# Patient Record
Sex: FEMALE | Race: WHITE | Employment: UNEMPLOYED | URBAN - METROPOLITAN AREA
[De-identification: names, ages, dates, MRNs, and addresses within clinical notes are randomized per-mention and may not be internally consistent; named-entity substitution may affect disease eponyms.]

---

## 2022-01-27 ENCOUNTER — HOSPITAL ENCOUNTER (EMERGENCY)
Age: 42
Discharge: HOME | End: 2022-01-27
Payer: COMMERCIAL

## 2022-01-27 VITALS
DIASTOLIC BLOOD PRESSURE: 73 MMHG | HEIGHT: 67 IN | TEMPERATURE: 96.9 F | WEIGHT: 140 LBS | OXYGEN SATURATION: 100 % | BODY MASS INDEX: 21.97 KG/M2 | HEART RATE: 98 BPM | SYSTOLIC BLOOD PRESSURE: 120 MMHG | RESPIRATION RATE: 16 BRPM

## 2022-01-27 DIAGNOSIS — L02.91 ABSCESS: Primary | ICD-10-CM

## 2022-01-27 PROCEDURE — 2500000003 HC RX 250 WO HCPCS: Performed by: NURSE PRACTITIONER

## 2022-01-27 PROCEDURE — 99284 EMERGENCY DEPT VISIT MOD MDM: CPT

## 2022-01-27 PROCEDURE — 6360000002 HC RX W HCPCS: Performed by: NURSE PRACTITIONER

## 2022-01-27 PROCEDURE — 87070 CULTURE OTHR SPECIMN AEROBIC: CPT

## 2022-01-27 PROCEDURE — 87077 CULTURE AEROBIC IDENTIFY: CPT

## 2022-01-27 PROCEDURE — 96372 THER/PROPH/DIAG INJ SC/IM: CPT

## 2022-01-27 PROCEDURE — 87186 SC STD MICRODIL/AGAR DIL: CPT

## 2022-01-27 PROCEDURE — 6370000000 HC RX 637 (ALT 250 FOR IP): Performed by: NURSE PRACTITIONER

## 2022-01-27 PROCEDURE — 87205 SMEAR GRAM STAIN: CPT

## 2022-01-27 PROCEDURE — 10060 I&D ABSCESS SIMPLE/SINGLE: CPT

## 2022-01-27 RX ORDER — LORAZEPAM 1 MG/1
1 TABLET ORAL ONCE
Status: COMPLETED | OUTPATIENT
Start: 2022-01-27 | End: 2022-01-27

## 2022-01-27 RX ORDER — KETOROLAC TROMETHAMINE 30 MG/ML
30 INJECTION, SOLUTION INTRAMUSCULAR; INTRAVENOUS ONCE
Status: COMPLETED | OUTPATIENT
Start: 2022-01-27 | End: 2022-01-27

## 2022-01-27 RX ORDER — NAPROXEN 500 MG/1
500 TABLET ORAL 2 TIMES DAILY WITH MEALS
Qty: 14 TABLET | Refills: 0 | Status: SHIPPED | OUTPATIENT
Start: 2022-01-27 | End: 2022-04-08

## 2022-01-27 RX ORDER — SULFAMETHOXAZOLE AND TRIMETHOPRIM 800; 160 MG/1; MG/1
1 TABLET ORAL ONCE
Status: COMPLETED | OUTPATIENT
Start: 2022-01-27 | End: 2022-01-27

## 2022-01-27 RX ORDER — CEFUROXIME AXETIL 500 MG/1
500 TABLET ORAL 2 TIMES DAILY
Qty: 20 TABLET | Refills: 0 | Status: SHIPPED | OUTPATIENT
Start: 2022-01-27 | End: 2022-02-06

## 2022-01-27 RX ORDER — SULFAMETHOXAZOLE AND TRIMETHOPRIM 800; 160 MG/1; MG/1
1 TABLET ORAL 2 TIMES DAILY
Qty: 20 TABLET | Refills: 0 | Status: SHIPPED | OUTPATIENT
Start: 2022-01-27 | End: 2022-02-06

## 2022-01-27 RX ORDER — LIDOCAINE HYDROCHLORIDE 10 MG/ML
5 INJECTION, SOLUTION INFILTRATION; PERINEURAL ONCE
Status: COMPLETED | OUTPATIENT
Start: 2022-01-27 | End: 2022-01-27

## 2022-01-27 RX ADMIN — LORAZEPAM 1 MG: 1 TABLET ORAL at 12:49

## 2022-01-27 RX ADMIN — SULFAMETHOXAZOLE AND TRIMETHOPRIM 1 TABLET: 800; 160 TABLET ORAL at 14:28

## 2022-01-27 RX ADMIN — KETOROLAC TROMETHAMINE 30 MG: 30 INJECTION, SOLUTION INTRAMUSCULAR at 12:49

## 2022-01-27 RX ADMIN — LIDOCAINE HYDROCHLORIDE 5 ML: 10 INJECTION, SOLUTION INFILTRATION; PERINEURAL at 12:52

## 2022-01-27 ASSESSMENT — PAIN DESCRIPTION - LOCATION: LOCATION: OTHER (COMMENT)

## 2022-01-27 ASSESSMENT — PAIN DESCRIPTION - PROGRESSION: CLINICAL_PROGRESSION: NOT CHANGED

## 2022-01-27 ASSESSMENT — PAIN DESCRIPTION - FREQUENCY: FREQUENCY: CONTINUOUS

## 2022-01-27 ASSESSMENT — PAIN DESCRIPTION - ORIENTATION: ORIENTATION: RIGHT

## 2022-01-27 ASSESSMENT — PAIN SCALES - GENERAL
PAINLEVEL_OUTOF10: 10
PAINLEVEL_OUTOF10: 8
PAINLEVEL_OUTOF10: 10

## 2022-01-27 ASSESSMENT — PAIN DESCRIPTION - ONSET: ONSET: ON-GOING

## 2022-01-27 ASSESSMENT — PAIN DESCRIPTION - PAIN TYPE: TYPE: ACUTE PAIN

## 2022-01-27 NOTE — ED PROVIDER NOTES
[address]  Department of Emergency Medicine   ED  Encounter Note  Admit Date/RoomTime: 2022 12:14 PM  ED Room:   NAME: Kai Barbour  : 1980  MRN: [id number]     Chief Complaint:  Wound Check (lump under right axilla, patient squeezed it and poked a needle through it last night, worse today, large red area no drainage)    HISTORY OF PRESENT ILLNESS        Kai Barbour is a 35 y.o. female who presents to the ED for evaluation of lumps in her right armpit that began 3 to 5 days ago. Patient states that her pain is aggravated by palpation and movement. She attempted to relieve the irritation by popping it with a needle last night. She states nothing came out of it and it made it more sore. She is noticed redness, swelling and discomfort with no associated fever, chills, myalgias or malaise. She denies a history of this in the past and reports her tetanus vaccination to be up-to-date. She denies being a diabetic. She denies being on methadone and states she has been clean from heroin for over 5 years. Her symptoms are moderate in severity and persistent nature. ROS   Pertinent positives and negatives are stated within HPI, all other systems reviewed and are negative. Past Medical History:  has a past medical history of Depression, Hepatitis C, History of MRSA infection, Substance abuse ([address] and Varicosities. Surgical History:  has a past surgical history that includes  section and Dilation & curettage. Social History:  reports that she has been smoking. She has a 8.50 pack-year smoking history. She has never used smokeless tobacco. She reports that she does not drink alcohol and does not use drugs. Family History: family history is not on file. Allergies: Patient has no known allergies. PHYSICAL EXAM   Oxygen Saturation Interpretation: Normal on room air analysis.         ED Triage Vitals   BP Temp Temp Source Pulse Resp SpO2 Height Weight   01/27/22 1210 01/27/22 1210 01/27/22 1210 01/27/22 1210 01/27/22 1210 01/27/22 1210 01/27/22 1217 01/27/22 1217   120/73 96.9 °F (36.1 °C) Infrared 98 16 100 % 5' 7\" (1.702 m) 140 lb (63.5 kg)       Physical Exam  Constitutional/General: Alert and oriented x3, well appearing, non toxic  HEENT:  NC/NT. PERRLA. Oral mucosa moist. Airway patent. Neck: Supple, full ROM. No midline vertebral tenderness or crepitus. Respiratory: Lung sounds clear to auscultation bilaterally. No wheezes, rhonchi or stridor. Not in respiratory distress. CV:  Regular rate. No resting tachycardia, HR 86 bpm. Regular rhythm. No murmurs or rubs. 2+ distal pulses. Musculoskeletal: Moves all extremities x 4. No bony tenderness to the right upper extremity. Strong radial and ulnar pulse. Full sensory and motor movement along the radial, median and ulnar nerve distribution of the right upper extremity. Warm and well perfused. Capillary refill <3 seconds  Integument: Skin warm and dry. No rashes. To the right axilla are two abscesses with localized erythema and central fluctuance with no spontaneous drainage. They are tender to manipulation and without lymphangitis. Neurologic: Alert and oriented with no focal deficits, symmetric strength 5/5 in the upper and lower extremities bilaterally. Psychiatric: Anxious affect. Good eye contact. No hallucinations or delusions. No suicidal ideation. Patient does not appear intoxicated. Lab / Imaging Results   (All laboratory and radiology results have been personally reviewed by myself)  Labs:  No results found for this visit on 01/27/22. Imaging: All Radiology results interpreted by Radiologist unless otherwise noted.   No orders to display       ED Course / Medical Decision Making     Medications   ketorolac (TORADOL) injection 30 mg (30 mg IntraMUSCular Given 1/27/22 1249)   lidocaine 1 % injection 5 mL (5 mLs IntraDERmal Given by Other 1/27/22 1252)   LORazepam (ATIVAN) tablet 1 mg (1 mg Oral Given 1/27/22 1249)   sulfamethoxazole-trimethoprim (BACTRIM DS;SEPTRA DS) 800-160 MG per tablet 1 tablet (1 tablet Oral Given 1/27/22 1428)        Re-examination:  1/27/22       Time: 1430  Patients condition is improving after treatment. Consult(s):   None    Procedure(s):     PROCEDURE  1/27/22       Time: 1410    INCISION AND DRAINAGE  Risks, benefits and alternatives (for applicable procedures below) described. Performed By: ELIAS Lomas CNP. Indication: axillary abscess  Informed consent obtained: The patient provided verbal consent for this procedure. .  Prep: The skin was cleansed with povidone iodine and draped in a sterile fashion. Local Anesthesia:  obtained with Lidocaine 1% without epinephrine. Incision: The axillary abscess was Incised by scalpal and copious, purulent fluid was drained. A wound culture was obtained. The wound  was irrigated and was not packed with iodoform gauze. The wound was then covered with a sterile dressing. Patient tolerated the procedure well. MDM:   Patient is afebrile and not tachycardic. She has localized erythema without cellulitis or lymphangitis. Given her history of MRSA infection, she was covered with Bactrim with the addition of Ceftin given the localized erythema and naproxen twice daily with food. Given her anxiety, her abscesses were not packed. Her tetanus vaccination is reported to be up-to-date. She was given outpatient follow-up with [place] [address] physicians in general surgery clinic. She is aware of signs and symptoms indicative of reevaluation in the emergency department setting. Otherwise warm compresses, antibiotics and NSAIDs while pending with follow-up appointment. Patient verbalizes understanding of instructions and departed in stable condition.      Plan of Care/Counseling:  ELIAS Lomas CNP reviewed today's visit with the patient and significant other in addition to providing specific details for the plan of care and counseling regarding the diagnosis and prognosis. Questions are answered at this time and are agreeable with the plan. ASSESSMENT     1. Abscess      PLAN   Discharged home. Patient condition is stable    New Medications     Discharge Medication List as of 1/27/2022  2:36 PM      START taking these medications    Details   sulfamethoxazole-trimethoprim (BACTRIM DS;SEPTRA DS) 800-160 MG per tablet Take 1 tablet by mouth 2 times daily for 10 days, Disp-20 tablet, R-0Normal      cefUROXime (CEFTIN) 500 MG tablet Take 1 tablet by mouth 2 times daily for 10 days, Disp-20 tablet, R-0Normal      naproxen (NAPROSYN) 500 MG tablet Take 1 tablet by mouth 2 times daily (with meals) for 7 days, Disp-14 tablet, R-0Normal           Electronically signed by ELIAS Don CNP   DD: 1/27/22  **This report was transcribed using voice recognition software. Every effort was made to ensure accuracy; however, inadvertent computerized transcription errors may be present.   END OF ED PROVIDER NOTE       ELIAS Don CNP  01/27/22 3242

## 2022-01-30 LAB
GRAM STAIN RESULT: ABNORMAL
ORGANISM: ABNORMAL
WOUND/ABSCESS: ABNORMAL

## 2022-04-08 ENCOUNTER — HOSPITAL ENCOUNTER (OUTPATIENT)
Dept: PSYCHIATRY | Age: 42
Setting detail: THERAPIES SERIES
Discharge: HOME | End: 2022-04-08
Payer: COMMERCIAL

## 2022-04-08 PROCEDURE — 90791 PSYCH DIAGNOSTIC EVALUATION: CPT

## 2022-04-08 ASSESSMENT — SLEEP AND FATIGUE QUESTIONNAIRES
DIFFICULTY FALLING ASLEEP: YES
DIFFICULTY ARISING: NO
DO YOU USE A SLEEP AID: NO
DIFFICULTY STAYING ASLEEP: YES
AVERAGE NUMBER OF SLEEP HOURS: 5

## 2022-04-08 ASSESSMENT — ANXIETY QUESTIONNAIRES
2. NOT BEING ABLE TO STOP OR CONTROL WORRYING: 3
5. BEING SO RESTLESS THAT IT IS HARD TO SIT STILL: 3
3. WORRYING TOO MUCH ABOUT DIFFERENT THINGS: 3
6. BECOMING EASILY ANNOYED OR IRRITABLE: 3
7. FEELING AFRAID AS IF SOMETHING AWFUL MIGHT HAPPEN: 3
GAD7 TOTAL SCORE: 21
IF YOU CHECKED OFF ANY PROBLEMS ON THIS QUESTIONNAIRE, HOW DIFFICULT HAVE THESE PROBLEMS MADE IT FOR YOU TO DO YOUR WORK, TAKE CARE OF THINGS AT HOME, OR GET ALONG WITH OTHER PEOPLE: EXTREMELY DIFFICULT
1. FEELING NERVOUS, ANXIOUS, OR ON EDGE: 3
4. TROUBLE RELAXING: 3

## 2022-04-08 ASSESSMENT — LIFESTYLE VARIABLES: HISTORY_ALCOHOL_USE: NO

## 2022-04-08 ASSESSMENT — PATIENT HEALTH QUESTIONNAIRE - PHQ9: SUM OF ALL RESPONSES TO PHQ QUESTIONS 1-9: 19
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
## 2022-04-26 ENCOUNTER — HOSPITAL ENCOUNTER (OUTPATIENT)
Dept: PSYCHIATRY | Age: 42
Setting detail: THERAPIES SERIES
Discharge: HOME | End: 2022-04-26
Payer: COMMERCIAL

## 2022-04-26 DIAGNOSIS — F41.9 ANXIETY DISORDER, UNSPECIFIED TYPE: ICD-10-CM

## 2022-04-26 DIAGNOSIS — F90.9 ATTENTION DEFICIT HYPERACTIVITY DISORDER (ADHD), UNSPECIFIED ADHD TYPE: Primary | ICD-10-CM

## 2022-04-26 PROCEDURE — H2020 THER BEHAV SVC, PER DIEM: HCPCS

## 2022-04-26 PROCEDURE — 90792 PSYCH DIAG EVAL W/MED SRVCS: CPT | Performed by: PSYCHIATRY & NEUROLOGY

## 2022-04-26 RX ORDER — DEXTROAMPHETAMINE SACCHARATE, AMPHETAMINE ASPARTATE, DEXTROAMPHETAMINE SULFATE AND AMPHETAMINE SULFATE 2.5; 2.5; 2.5; 2.5 MG/1; MG/1; MG/1; MG/1
10 TABLET ORAL 2 TIMES DAILY
Qty: 28 TABLET | Refills: 0 | Status: SHIPPED | OUTPATIENT
Start: 2022-04-26 | End: 2022-05-03 | Stop reason: HOSPADM

## 2022-04-26 RX ORDER — DIVALPROEX SODIUM 250 MG/1
250 TABLET, DELAYED RELEASE ORAL 2 TIMES DAILY
Qty: 28 TABLET | Refills: 0 | Status: SHIPPED | OUTPATIENT
Start: 2022-04-26 | End: 2022-05-03 | Stop reason: SDUPTHER

## 2022-04-26 RX ORDER — CLONAZEPAM 0.5 MG/1
0.5 TABLET ORAL 2 TIMES DAILY
Qty: 28 TABLET | Refills: 0 | Status: SHIPPED | OUTPATIENT
Start: 2022-04-26 | End: 2022-05-03 | Stop reason: SDUPTHER

## 2022-04-26 NOTE — PROGRESS NOTES
Group Therapy Note     Date: 4/26/2022  Start Time: 840  End Time:  1030  Number of Participants: 8     Type of Group: Psychotherapy     Patient's Goal: To increase socialization and improve interpersonal relationships.     Notes: This was pts first iop group. She was initially reluctant to participate and say anything but by end of group had started to share some basic personal information regarding feelings d/t conflicts in relationships, racing thoughts, and that she had been to half-way in past and how that had changed her for the good. Group members were supportive of pt. Pt has begun to engage with others, related to peers, shared personal issues and provided some support and encouragement to others. Pt reports some improvement in mood following group.      Status After Intervention:  Unchanged     Participation Level:  Active Listener and Interactive     Participation Quality: Appropriate, Attentive, Sharing, and Supportive        Speech:  normal        Thought Process/Content: Logical        Affective Functioning: Blunted        Mood: depressed and anxious        Level of consciousness:  Alert, Oriented x4, and Attentive        Response to Learning: Able to verbalize current knowledge/experience, Able to verbalize/acknowledge new learning, and Progressing to goal        Endings: None Reported     Modes of Intervention: Support, Socialization, and Exploration        Discipline Responsible: /Counselor        Signature: PANTERA Fajardo

## 2022-04-26 NOTE — PLAN OF CARE
Group Therapy Note     Date: 4/26/2022  Start Time: 7576  End Time:  1200  Number of Participants: 9     Type of Group: Recovery     Group Topic: Anger/Anxiety  Module Name: ex. 12: What is driving our anger (or anxiety)/ Iceberg activity     Patient's Goal: Pt will participate in discussion related to emotions that might be behind our anger or anxiety. Pt will be able to identify emotions that are seen as more acceptable than other emotions and identify triggers that lead to anger and anxiety. Pt will set a committed action goal.     Notes: Pt participated actively in group discussion and reflected on concepts discussed. Pt engaged with others, shared personal experiences, and was open to learning new information. Values:personal health  Committed Action Goal: focus on me tonight     Status After Intervention: Unchanged     Participation Level:  Active Listener and Interactive     Participation Quality: Appropriate, Attentive, Sharing, and Supportive        Speech:  normal        Thought Process/Content: Logical        Affective Functioning: Blunted        Mood: depressed and anxious        Level of consciousness:  Alert, Oriented x4, and Attentive        Response to Learning: Able to verbalize current knowledge/experience, Able to verbalize/acknowledge new learning, and Progressing to goal        Endings: None Reported     Modes of Intervention: Education, Support, Socialization, Exploration, and Activity        Discipline Responsible: /Counselor        Signature:  PANTERA Gamboa
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
## 2022-04-28 ENCOUNTER — HOSPITAL ENCOUNTER (OUTPATIENT)
Dept: PSYCHIATRY | Age: 42
Setting detail: THERAPIES SERIES
Discharge: HOME | End: 2022-04-28
Payer: COMMERCIAL
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
## 2022-05-02 ENCOUNTER — HOSPITAL ENCOUNTER (OUTPATIENT)
Dept: PSYCHIATRY | Age: 42
Setting detail: THERAPIES SERIES
Discharge: HOME | End: 2022-05-02
Payer: COMMERCIAL

## 2022-05-02 PROCEDURE — H2020 THER BEHAV SVC, PER DIEM: HCPCS
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
## 2022-05-02 NOTE — PROGRESS NOTES
Group Therapy Note     Date: 5/02/2022  Start Time: 4941  End Time:  1200  Number of Participants: 8     Type of Group: Recovery     Group Topic: Depression  Module Name: ex. 3: What is Depression like for me. ACT activity: Mindfulness/relaxation training   ACT Principal: demonstrating willingness and values     Patient's Goal: Pt will participate in discussion of how depression affects them mentally, emotionally, physically, and spirituality. Pt will set a committed action goal.     Notes: Pt participated actively in group discussion and reflected on concepts discussed. Pt engaged with others, shared personal experiences, and was open to learning new information. Status After Intervention: Unchanged     Participation Level:  Active Listener and Interactive     Participation Quality: Appropriate, Attentive, Sharing, and Supportive        Speech:  normal        Thought Process/Content: Logical        Affective Functioning: Blunted        Mood: depressed and anxious        Level of consciousness:  Alert, Oriented x4, and Attentive        Response to Learning: Able to verbalize current knowledge/experience, Able to verbalize/acknowledge new learning, and Progressing to goal        Endings: None Reported     Modes of Intervention: Education, Support, Socialization, Exploration, and Activity        Discipline Responsible: /Counselor        Signature:  PANTERA Russo

## 2022-05-03 ENCOUNTER — HOSPITAL ENCOUNTER (OUTPATIENT)
Dept: PSYCHIATRY | Age: 42
Setting detail: THERAPIES SERIES
Discharge: HOME | End: 2022-05-03
Payer: COMMERCIAL

## 2022-05-03 DIAGNOSIS — F90.9 ATTENTION DEFICIT HYPERACTIVITY DISORDER (ADHD), UNSPECIFIED ADHD TYPE: ICD-10-CM

## 2022-05-03 DIAGNOSIS — F41.9 ANXIETY DISORDER, UNSPECIFIED TYPE: ICD-10-CM

## 2022-05-03 DIAGNOSIS — F31.81 BIPOLAR II DISORDER (HCC): ICD-10-CM

## 2022-05-03 PROCEDURE — 99214 OFFICE O/P EST MOD 30 MIN: CPT | Performed by: PSYCHIATRY & NEUROLOGY

## 2022-05-03 PROCEDURE — H2020 THER BEHAV SVC, PER DIEM: HCPCS

## 2022-05-03 RX ORDER — DIVALPROEX SODIUM 250 MG/1
250 TABLET, DELAYED RELEASE ORAL 2 TIMES DAILY
Qty: 28 TABLET | Refills: 0 | Status: SHIPPED | OUTPATIENT
Start: 2022-05-03 | End: 2022-05-12 | Stop reason: SDUPTHER

## 2022-05-03 RX ORDER — DEXTROAMPHETAMINE SACCHARATE, AMPHETAMINE ASPARTATE, DEXTROAMPHETAMINE SULFATE AND AMPHETAMINE SULFATE 5; 5; 5; 5 MG/1; MG/1; MG/1; MG/1
20 TABLET ORAL 2 TIMES DAILY
Qty: 28 TABLET | Refills: 0 | Status: SHIPPED | OUTPATIENT
Start: 2022-05-03 | End: 2022-05-12 | Stop reason: SDUPTHER

## 2022-05-03 RX ORDER — CLONAZEPAM 0.5 MG/1
0.5 TABLET ORAL 3 TIMES DAILY
Qty: 42 TABLET | Refills: 0 | Status: SHIPPED | OUTPATIENT
Start: 2022-05-03 | End: 2022-05-12 | Stop reason: SDUPTHER
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
## 2022-05-03 NOTE — PLAN OF CARE
Group Therapy Note     Date: 5/03/2022  Start Time: 0528  End Time:  1200  Number of Participants: 10     Type of Group: Recovery     Group Topic: Depression  Module Name: ex. 5: Coping and Choices and ex 6:The Impact of Depression  ACT activity: Anxiety machine and what are the numbers metaphor   ACT Principal: futility of misapplied control. Patient's Goal: Pt will participate in discussion related to how they have tried to escape feelings of depression and how these unhelpful coping strategies often make depression worse. Pt able to identify how depression has affected people in their life. Pt will set a committed action goal.     Notes: Pt participated actively in group discussion and reflected on concepts discussed. Pt engaged with others, shared personal experiences, and was open to learning new information. Status After Intervention: Unchanged     Participation Level:  Active Listener and Interactive     Participation Quality: Appropriate, Attentive, Sharing, and Supportive        Speech:  normal        Thought Process/Content: Logical        Affective Functioning: Congruent        Mood: euthymic        Level of consciousness:  Alert, Oriented x4, and Attentive        Response to Learning: Able to verbalize current knowledge/experience, Able to verbalize/acknowledge new learning, and Progressing to goal        Endings: None Reported     Modes of Intervention: Education, Support, Socialization, Exploration, and Activity        Discipline Responsible: /Counselor        Signature:  PANTERA Urias

## 2022-05-03 NOTE — PLAN OF CARE
Group Therapy Note     Date: 5/03/2022  Start Time: 840  End Time:  1030  Number of Participants: 10     Type of Group: Psychotherapy     Patient's Goal: To increase socialization and improve interpersonal relationships. Notes: Pt was an active participant in group focusing on expression of feelings r/t stressors, mh symptoms, and effects on relationships. Group also discussed how worrying about future or past keeps us from living in the present and increases depression and anxiety. Pt in brighter mood than yesterday and shared she enjoys cleaning and spending time by herself so had an enjoyable afternoon/evening yesterday. Pt. engaged easily with others, related to peers, shared personal issues and provided support and encouragement to others. Pt reports some improvement in mood following group. Status After Intervention:  Unchanged     Participation Level:  Active Listener and Interactive     Participation Quality: Appropriate, Attentive, Sharing, and Supportive        Speech:  normal        Thought Process/Content: Logical        Affective Functioning: Congruent        Mood: euthymic        Level of consciousness:  Alert, Oriented x4, and Attentive        Response to Learning: Able to verbalize current knowledge/experience, Able to verbalize/acknowledge new learning, Able to retain information, and Progressing to goal        Endings: None Reported     Modes of Intervention: Support, Socialization, and Exploration        Discipline Responsible: /Counselor        Signature:  PANTERA Irving

## 2022-05-05 ENCOUNTER — HOSPITAL ENCOUNTER (OUTPATIENT)
Dept: PSYCHIATRY | Age: 42
Setting detail: THERAPIES SERIES
Discharge: HOME | End: 2022-05-05
Payer: COMMERCIAL

## 2022-05-06 NOTE — PLAN OF CARE
Treatment team meeting held on 5/03/22 in which pts case was discussed. Pt has attended 4 iop days so far. Plan is for pt to continue attending iop three days weekly and medication management as needed with Dr Gary Gamboa started to [facility] progress towards goals of :  1. To verbalize/display a decrease in Anxiety smptoms and  2. To demonstrate healthy coping skills.     She has started to make connections with other group members through sharing personal experiences and providing feedback and support to others. She appears receptive to learning how to improve mood stability and coping skills.       Will continue to monitor success with interventions.

## 2022-05-09 ENCOUNTER — HOSPITAL ENCOUNTER (OUTPATIENT)
Dept: PSYCHIATRY | Age: 42
Setting detail: THERAPIES SERIES
Discharge: HOME | End: 2022-05-09
Payer: COMMERCIAL

## 2022-05-09 PROCEDURE — H2020 THER BEHAV SVC, PER DIEM: HCPCS

## 2022-05-09 NOTE — PLAN OF CARE
Group Therapy Note     Date: 5/09/2022  Start Time: 840  End Time:  1030  Number of Participants:10     Type of Group: Psychotherapy     Patient's Goal: To increase socialization and improve interpersonal relationships. Notes: Pt was an active participant in group focusing on expression of feelings r/t stressors, mh symptoms, and effects on relationships. Some group members shared having difficult weekends with poor motivation and we discussed that it is common to have some minor set backs but that doesn't mean that all progress has been lost.  Pt shared she is having some struggles but didn't want to share in group today. She did have a good mothers day as her stepdtr and family had her spend the whole day with them. Pt engages easily with others, related to peers, shared some personal issues and provided support and encouragement to others. Pt reports some improvement in mood following group. Status After Intervention:  Unchanged     Participation Level:  Active Listener and Interactive     Participation Quality: Appropriate, Attentive, Sharing, and Supportive        Speech:  normal        Thought Process/Content: Logical        Affective Functioning: Blunted        Mood: depressed and anxious        Level of consciousness:  Alert, Oriented x4, and Attentive        Response to Learning: Able to verbalize current knowledge/experience, Able to verbalize/acknowledge new learning, Able to retain information, Able to change behavior, and Progressing to goal        Endings: None Reported     Modes of Intervention: Support, Socialization, and Exploration        Discipline Responsible: /Counselor        Signature:  PANTERA Russo

## 2022-05-09 NOTE — PLAN OF CARE
Group Therapy Note     Date: 5/09/2022  Start Time: 5182  End Time:  1200  Number of Participants: 10     Type of Group: Recovery     Group Topic: Depression  Module Name: ex. 9: Core Beliefs      Patient's Goal: Pt will participate in group discussion regarding core beliefs they have regarding themselves, the world, and others. Pt will be able to identify a negative core belief and discuss how this affects them at work, school, with assertiveness, social situations, and thoughts about the future. Pt will be able to discuss 3 pieces of evidence contrary to negative core belief and then will be able to turn negative core belief into a more positive core belief. Pt will set a committed action goal.     Notes:  Pt participated actively in group discussion and reflected on concepts discussed. Pt was able to identify basic concepts but will need more practice. Pt engaged with others, shared personal experiences, and was open to learning new information. Status After Intervention: Unchanged     Participation Level:  Active Listener and Interactive     Participation Quality: Appropriate, Attentive, Sharing, and Supportive        Speech:  normal        Thought Process/Content: Logical        Affective Functioning: Blunted        Mood: Depressed and anxious        Level of consciousness:  Alert, Oriented x4, and Attentive        Response to Learning: Able to verbalize current knowledge/experience, Able to verbalize/acknowledge new learning, Able to retain information, Able to change behavior, and Progressing to goal        Endings: None Reported     Modes of Intervention: Education, Support, Socialization, Exploration, and Activity        Discipline Responsible: /Counselor        Signature:  PANTERA Fajardo

## 2022-05-10 ENCOUNTER — HOSPITAL ENCOUNTER (OUTPATIENT)
Dept: PSYCHIATRY | Age: 42
Setting detail: THERAPIES SERIES
Discharge: HOME | End: 2022-05-10
Payer: COMMERCIAL

## 2022-05-11 NOTE — PLAN OF CARE
Discussed pt progress during treatment team meeting on 5/10. Pt attendance has been somewhat inconsistent thus far. Pt is making progress by means of becoming more comfortable sharing and building connections in groups. Pt is engaged in individual counseling with Edmond Vitale at [facility]. Will continue to monitor success with interventions.

## 2022-05-12 ENCOUNTER — HOSPITAL ENCOUNTER (OUTPATIENT)
Dept: PSYCHIATRY | Age: 42
Setting detail: THERAPIES SERIES
Discharge: HOME | End: 2022-05-12
Payer: COMMERCIAL

## 2022-05-12 DIAGNOSIS — F31.81 BIPOLAR II DISORDER (HCC): ICD-10-CM

## 2022-05-12 DIAGNOSIS — F43.10 PTSD (POST-TRAUMATIC STRESS DISORDER): ICD-10-CM

## 2022-05-12 DIAGNOSIS — F90.9 ATTENTION DEFICIT HYPERACTIVITY DISORDER (ADHD), UNSPECIFIED ADHD TYPE: ICD-10-CM

## 2022-05-12 DIAGNOSIS — F41.9 ANXIETY DISORDER, UNSPECIFIED TYPE: ICD-10-CM

## 2022-05-12 PROCEDURE — 99214 OFFICE O/P EST MOD 30 MIN: CPT | Performed by: PSYCHIATRY & NEUROLOGY

## 2022-05-12 PROCEDURE — H2020 THER BEHAV SVC, PER DIEM: HCPCS

## 2022-05-12 RX ORDER — CLONAZEPAM 0.5 MG/1
0.5 TABLET ORAL 3 TIMES DAILY
Qty: 90 TABLET | Refills: 0 | Status: SHIPPED | OUTPATIENT
Start: 2022-05-12 | End: 2022-06-06 | Stop reason: SDUPTHER

## 2022-05-12 RX ORDER — DEXTROAMPHETAMINE SACCHARATE, AMPHETAMINE ASPARTATE, DEXTROAMPHETAMINE SULFATE AND AMPHETAMINE SULFATE 5; 5; 5; 5 MG/1; MG/1; MG/1; MG/1
20 TABLET ORAL 2 TIMES DAILY
Qty: 60 TABLET | Refills: 0 | Status: SHIPPED | OUTPATIENT
Start: 2022-05-12 | End: 2022-06-06 | Stop reason: SDUPTHER

## 2022-05-12 RX ORDER — DIVALPROEX SODIUM 250 MG/1
250 TABLET, DELAYED RELEASE ORAL 2 TIMES DAILY
Qty: 60 TABLET | Refills: 0 | Status: SHIPPED | OUTPATIENT
Start: 2022-05-12 | End: 2022-06-06 | Stop reason: SDUPTHER

## 2022-05-12 NOTE — PLAN OF CARE
Signed              Group Therapy Note     Date: 5/12/2022  Start Time: 840  End Time:  4300  Number of Participants:8     Type of Group: Psychotherapy     Patient's Goal: Responding to stressful situations     Notes: Pt was an active participant in group focusing on expression of feelings r/t stressors, mh symptoms, and effects on relationships. Sonia Gonzalez is anxious and stressed because she is living with her boyfriend and other peers whom the boyfriend knows. There are children and young teens as well. All of the interaction has her stressed. She is likewise stressed because she has been having difficulty filling out her medical record forms for a SSI benefit. 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 She noted she slept only four hours last night and the night before her sleep was poor. She is to see the psychiatrist today. She noted she has been having dizzy spells and believes it is due to her medication. She was active on group. She is very active physically and this helps her to self regulate her emotions. Status After Intervention:  Improved     Participation Level:  Active Listener and Interactive     Participation Quality: Appropriate, Attentive, Sharing, and Supportive        Speech:  normal        Thought Process/Content: Logical        Affective Functioning: brightened        Mood:  anxious        Level of consciousness:  Alert, Oriented x 4, and Attentive        Response to Learning: Able to verbalize current knowledge/experience, Able to verbalize/acknowledge new learning, Able to retain information, Able to change behavior, and Progressing to goal        Endings: None Reported     Modes of Intervention: Support, Socialization, and Exploration        Discipline Responsible: /Counselor        Signature:  Lobito Dougherty

## 2022-05-12 NOTE — BH NOTE
PSYCHIATRY ATTENDING NOTE    CC: \"Having a rough day. \"    S: Patient being seen at [address] [place] [address] in follow-up for bipolar II disorder, PTSD and ADHD. No medication changes made last appointment. Met with patient to discuss progress with treatment. Nel Maya presents scattered today and reports feeling stressed over some dynamics at home with boyfriend. She reports she is now taking 20 mg of Adderall three times a day because she \"just needs it\" - discussed with patient that I am not comfortable with her taking that much and will not be increasing her dose beyond 40 mg. She reports having tried the Depakote again but it made her dizzy; however, she had taken both together and I clarified with her that she is to take 250 mg BID and not 500 mg at once. 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 She missed groups earlier this week due to transportation but feels that being here is therapeutic for her. No acute safety concerns. MSE: Roman Lawson female appears age. Cooperative, forthcoming. Normal psychomotor activity. Normal gait, strength, tone, eye contact. Mood anxious. Affect congruent. Speech a little fast but not pressured. Thoughts generally organized. Content future-oriented. No active suicidal or homicidal ideations. No paranoia, overt delusions or hallucinations. Orientation, concentration, recent and remote memory are grossly intact. Fund of knowledge fair. Language use fair. Insight and judgment fair. MEDICATIONS:  Depakote  mg bid (cont)                                      Adderall 20 mg bid (cont)                                      Klonopin 0.5 mg tid (cont)    ASSESSMENT:  Bipolar II Disorder, mixed                                      PTSD                                      ADHD                                      OUD in full remission     PLAN: Continue IOP and current medications.  Clarified instructions again with Depakote and patient to try taking correctly. Discussed with patient that I will not be increasing her Adderall beyond 40 mg as this is considered a maximum dose and there is no proven benefit to going beyond this. Continue to monitor symptoms, side effects and response to medications. See back two weeks.  Discuss with team.                           Electronically signed by Malgorzata Troncoso MD on 5/12/2022 at 11:59 AM

## 2022-05-12 NOTE — PROGRESS NOTES
Dennise Rivero did not attend second group.     Electronically signed by JOSHUA Kaur LSW on 5/12/2022 at 4:30 PM

## 2022-05-16 ENCOUNTER — HOSPITAL ENCOUNTER (OUTPATIENT)
Dept: PSYCHIATRY | Age: 42
Setting detail: THERAPIES SERIES
Discharge: HOME | End: 2022-05-16
Payer: COMMERCIAL

## 2022-05-16 NOTE — FLOWSHEET NOTE
Patient did not show for group.     Electronically signed by JOSHUA Peña, PRICE on 5/16/2022 at 1:13 PM

## 2022-05-17 ENCOUNTER — HOSPITAL ENCOUNTER (OUTPATIENT)
Dept: PSYCHIATRY | Age: 42
Setting detail: THERAPIES SERIES
End: 2022-05-17
Payer: COMMERCIAL

## 2022-05-17 ENCOUNTER — HOSPITAL ENCOUNTER (OUTPATIENT)
Dept: PSYCHIATRY | Age: 42
Setting detail: THERAPIES SERIES
Discharge: HOME | End: 2022-05-17
Payer: COMMERCIAL

## 2022-05-18 NOTE — CARE COORDINATION
5/18 11:21     Telephone call to pt, as she has not yet attended groups for the week. Reports having car trouble, and not feeling well lately- got her period for the first time in over a year. Pt plans to return to group Monday 5/23.

## 2022-05-18 NOTE — PLAN OF CARE
Discussed pt progress during treatment team meeting on 5/17. Pt has not attended program thus far this week, has not been in contact to relay absence. Plan is for pt to continue attending IOP three days weekly and medication management as needed with Dr Can Keller. If pt should return to program, will further assess progress toward goals of :  1. Mood stable  2. Able to verbalize and/or display a decrease in depressive symptoms  3. Demonstrates healthy coping mechanisms     There are concerns with pt taking medications as prescribed, which were addressed during last appointment with Dr. Can Keller. Pt will see Dr. Can Keller next on 5/26 to assess progress in medication compliance. Will continue to monitor success with interventions.

## 2022-05-19 ENCOUNTER — HOSPITAL ENCOUNTER (OUTPATIENT)
Dept: PSYCHIATRY | Age: 42
Setting detail: THERAPIES SERIES
End: 2022-05-19
Payer: COMMERCIAL

## 2022-05-24 ENCOUNTER — HOSPITAL ENCOUNTER (OUTPATIENT)
Dept: PSYCHIATRY | Age: 42
Setting detail: THERAPIES SERIES
Discharge: HOME | End: 2022-05-24
Payer: COMMERCIAL

## 2022-05-24 NOTE — PLAN OF CARE
Pt did not show for iop group. SW called and pt states she didn't have transportation scheduled so couldn't get here. She did call [facility] and rides are set up for Thursday 5/26 on.

## 2022-05-26 ENCOUNTER — HOSPITAL ENCOUNTER (OUTPATIENT)
Dept: PSYCHIATRY | Age: 42
Setting detail: THERAPIES SERIES
End: 2022-05-26
Payer: COMMERCIAL

## 2022-05-30 ENCOUNTER — HOSPITAL ENCOUNTER (OUTPATIENT)
Dept: PSYCHIATRY | Age: 42
Setting detail: THERAPIES SERIES
End: 2022-05-30
Payer: COMMERCIAL

## 2022-05-31 ENCOUNTER — HOSPITAL ENCOUNTER (OUTPATIENT)
Dept: PSYCHIATRY | Age: 42
Setting detail: THERAPIES SERIES
Discharge: HOME | End: 2022-05-31
Payer: COMMERCIAL

## 2022-05-31 PROCEDURE — H2020 THER BEHAV SVC, PER DIEM: HCPCS

## 2022-05-31 NOTE — CARE COORDINATION
SW spoke with pt re: concerns of water being turned off tomorrow, her frustration re: inability to find a ride to take her to  business cards that she ordered, wanting to find a women's bible study, and not feeling comfortable with aa meetings. SW called and left message for [facility]min in [facility] to see if she knows where there are women's bible studies and which ones might offer transportation. SW also got a list of [facility] meetings as it is a [place]-centered 12 step recovery program. 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

## 2022-05-31 NOTE — PLAN OF CARE
Date: 5/31/22     Start Time: 1045     End Time:  1200     Number of Participants: 6     Type of Group: Recovery   Topic of Group: Self esteem and compassion-exercise 6: The many ways to be smart. Pt Goal: Pt will participate actively and will reflect on concepts discussed. Pts will identify areas they excel in and how they demonstrate this in their lives. Pt will identify a value and choose a value driven action goal they will work on tonight. Notes: Pt actively participated in group discussion and ACT activity (Leila Gustafson) which illustrated concept of defusion. Pt set a value driven committed action goal. Pt engaged without difficulty, was able to share own experiences, and was open to learning new information. Status After Intervention:  Unchanged     Participation Level: Active Listener and Interactive     Participation Quality: Appropriate, Attentive, Sharing, and Supportive     Speech:  normal     Thought Process/Content: Logical     Affective Functioning: Congruent     Mood: euthymic     Level of consciousness:  Alert, Oriented x4, and Attentive     Response to Learning: Able to verbalize current knowledge/experience, Able to verbalize/acknowledge new learning, Able to retain information, Able to change behavior, and progressing to goal.     Endings: None Reported     Modes of Intervention: Support, Socialization, and Exploration     Discipline Responsible: /Counselor        [x] Check in completed and reviewed. Intervention required.  no
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
## 2022-06-02 ENCOUNTER — HOSPITAL ENCOUNTER (OUTPATIENT)
Dept: PSYCHIATRY | Age: 42
Setting detail: THERAPIES SERIES
Discharge: HOME | End: 2022-06-02
Payer: COMMERCIAL

## 2022-06-02 NOTE — PLAN OF CARE
Pt did not attend 6/01 or 6/02 as she didn't have transportation. She states she has transport starting on Mon.

## 2022-06-06 ENCOUNTER — HOSPITAL ENCOUNTER (OUTPATIENT)
Dept: PSYCHIATRY | Age: 42
Setting detail: THERAPIES SERIES
Discharge: HOME | End: 2022-06-06
Payer: COMMERCIAL

## 2022-06-06 DIAGNOSIS — F90.9 ATTENTION DEFICIT HYPERACTIVITY DISORDER (ADHD), UNSPECIFIED ADHD TYPE: ICD-10-CM

## 2022-06-06 DIAGNOSIS — F41.9 ANXIETY DISORDER, UNSPECIFIED TYPE: ICD-10-CM

## 2022-06-06 PROCEDURE — H2020 THER BEHAV SVC, PER DIEM: HCPCS

## 2022-06-06 PROCEDURE — 99213 OFFICE O/P EST LOW 20 MIN: CPT | Performed by: PSYCHIATRY & NEUROLOGY

## 2022-06-06 RX ORDER — DEXTROAMPHETAMINE SACCHARATE, AMPHETAMINE ASPARTATE, DEXTROAMPHETAMINE SULFATE AND AMPHETAMINE SULFATE 5; 5; 5; 5 MG/1; MG/1; MG/1; MG/1
20 TABLET ORAL 2 TIMES DAILY
Qty: 60 TABLET | Refills: 0 | Status: SHIPPED | OUTPATIENT
Start: 2022-06-06 | End: 2022-07-11

## 2022-06-06 RX ORDER — CLONAZEPAM 0.5 MG/1
0.5 TABLET ORAL 3 TIMES DAILY
Qty: 90 TABLET | Refills: 0 | Status: SHIPPED | OUTPATIENT
Start: 2022-06-06 | End: 2022-07-11

## 2022-06-06 RX ORDER — DIVALPROEX SODIUM 250 MG/1
250 TABLET, DELAYED RELEASE ORAL 2 TIMES DAILY
Qty: 60 TABLET | Refills: 0 | Status: SHIPPED | OUTPATIENT
Start: 2022-06-06 | End: 2022-07-11 | Stop reason: SDUPTHER
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
## 2022-06-06 NOTE — PLAN OF CARE
Date: 6/06/22     Start Time: 1045     End Time:  Noon     Number of Participants: 8     Type of Group: Recovery     Patient's Goal: Pt will participate in discussion and reflect on signs of healthy and unhealthy self esteem. Pt will also be able to identify how self esteem Is influenced by thinking, feelings, relationships etc and aknowledge how it changes when feeling you have failed at somethng versus when you have accomplished something important. Notes: Pt was active in group discussion. She had checked several items on both healthy and unhealthy self esteem checklists and was able to summarize that she tries so hard to please others that she always changes for them but that she wants to start trying to make self happy instead of others. Pt engaged without difficulty, was able to share own experiences, and was open to learning new information. Status After Intervention:  Unchanged     Participation Level: Active Listener and Interactive     Participation Quality: Appropriate, Attentive, Sharing, and Supportive     Speech:  normal     Thought Process/Content: Logical     Affective Functioning: Blunted     Mood: depressed and anxious     Level of consciousness:  Alert, Oriented x4, and Attentive     Response to Learning: Able to verbalize current knowledge/experience, Able to verbalize/acknowledge new learning, Able to retain information, Able to change behavior, and progressing to goal.     Endings: None Reported     Modes of Intervention: Support, Socialization, and Exploration     Discipline Responsible: /Counselor        [x] Check in completed and reviewed. Intervention required.  no
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
## 2022-06-07 ENCOUNTER — HOSPITAL ENCOUNTER (OUTPATIENT)
Dept: PSYCHIATRY | Age: 42
Setting detail: THERAPIES SERIES
Discharge: HOME | End: 2022-06-07
Payer: COMMERCIAL

## 2022-06-07 DIAGNOSIS — F90.9 ATTENTION DEFICIT HYPERACTIVITY DISORDER (ADHD), UNSPECIFIED ADHD TYPE: ICD-10-CM

## 2022-06-07 DIAGNOSIS — F41.9 ANXIETY: Primary | ICD-10-CM

## 2022-06-07 NOTE — PLAN OF CARE
ABEBA called pts phone number [phone]. She states that since she moved she wasn't able to get transportation today but that it is scheduled to begin thurs at her new address. She states she is doing well and will be back on 6/09.
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
## 2022-06-13 ENCOUNTER — HOSPITAL ENCOUNTER (OUTPATIENT)
Dept: PSYCHIATRY | Age: 42
Setting detail: THERAPIES SERIES
Discharge: HOME | End: 2022-06-13
Payer: COMMERCIAL

## 2022-06-13 NOTE — PLAN OF CARE
Pt did not attend iop today. SW called and spoke with her. She states transport never showed up for her today but is hoping to come tomorrow. She states that she and Grecia Dorantes went to [employer] on Fri at [employer] in [place] and liked it so much that they went to [employer] on Sunday there. They felt the [facility] was so welcoming that they joined the [employer] and have been scheduled to be baptized there.

## 2022-06-14 ENCOUNTER — HOSPITAL ENCOUNTER (OUTPATIENT)
Dept: PSYCHIATRY | Age: 42
Setting detail: THERAPIES SERIES
Discharge: HOME | End: 2022-06-14
Payer: COMMERCIAL

## 2022-06-20 ENCOUNTER — HOSPITAL ENCOUNTER (OUTPATIENT)
Dept: PSYCHIATRY | Age: 42
Setting detail: THERAPIES SERIES
End: 2022-06-20
Payer: COMMERCIAL

## 2022-06-27 ENCOUNTER — HOSPITAL ENCOUNTER (OUTPATIENT)
Dept: PSYCHIATRY | Age: 42
Setting detail: THERAPIES SERIES
Discharge: HOME | End: 2022-06-27
Payer: COMMERCIAL

## 2022-06-27 PROCEDURE — H2020 THER BEHAV SVC, PER DIEM: HCPCS

## 2022-06-27 NOTE — PLAN OF CARE
Date: 6/23/22     Start Time: 2516     End Time:  Noon     Number of Participants: 7     Type of Group: Cognitive skills  Topic of Group: Self esteem, ex. 25: You are lovable and capable  ACT Skills worked on:  [employer] (cognitive defusion, and willingness). Patient's Goal: Pt will participate in activity and discussion and reflect on principles discussed. Pt will be able to identify importance of loving ourselves just the way we are. Pt will be able to acknowledge ways in past that they tried to feel more lovable and identify abilities they they offer to others currently. Pt will participate in self esteem journaling exercise. Notes: Pt was active in group activity and discussion. Pt able to identify positive traits about self, was able to engage with group peers, shared own experiences, and was open to learning new information. Status After Intervention:  Unchanged     Participation Level: Active Listener and Interactive     Participation Quality: Appropriate, Attentive, Sharing, and Supportive     Speech:  normal     Thought Process/Content: Logical     Affective Functioning: Blunted   Mood: depressed and anxious     Level of consciousness:  Alert, Oriented x4, and Attentive     Response to Learning: Able to verbalize current knowledge/experience, Able to verbalize/acknowledge new learning, Able to retain information, Able to change behavior, and progressing to goal.     Endings: None Reported     Modes of Intervention: Support, Socialization, and Exploration     Discipline Responsible: /Counselor        [x] Check in completed and reviewed. Intervention required.  no

## 2022-06-27 NOTE — PLAN OF CARE
Date: 6/27/22     Start Time: 840     End Time:  1030     Number of Participants: 8     Type of Group: Psychotherapy     Patient's Goal: To increase socialization and improve interpersonal relationships. Notes: Pt was active in group focusing on expression of feelings related to stressors and how they impact mental health symptoms and interpersonal relationships. Themes of today's session surrounded the impact that others' words, actions, and expectations can have on our own mental health, as well as how our own thoughts may interfere with our progress and well being. Pt shared that unstable housing has been causing increased depression and how she feels others have been taking advantage of her. She discussed that friendship has suffered since she is no longer with her friend's dad. She made positive connections with others through discussing personal issues, and providing support and encouragement to others. Pt reports some improvement in mood following group. Status After Intervention:  Unchanged     Participation Level: Active Listener and Interactive     Participation Quality: Appropriate, Attentive, Sharing, and Supportive     Speech:  normal     Thought Process/Content: Logical     Affective Functioning: Blunted     Mood: depressed and anxious     Level of consciousness:  Alert, Oriented x4, and Attentive     Response to Learning: Able to verbalize current knowledge/experience, Able to verbalize/acknowledge new learning, Able to retain information, Able to change behaviors, and progressing to goal.     Endings: None Reported     Modes of Intervention: Support, Socialization, and Exploration     Discipline Responsible: /Counselor        [x] Check in completed and reviewed. Intervention required.  no

## 2022-06-28 ENCOUNTER — HOSPITAL ENCOUNTER (OUTPATIENT)
Dept: PSYCHIATRY | Age: 42
Setting detail: THERAPIES SERIES
Discharge: HOME | End: 2022-06-28
Payer: COMMERCIAL

## 2022-06-28 PROCEDURE — H2020 THER BEHAV SVC, PER DIEM: HCPCS

## 2022-06-28 NOTE — PLAN OF CARE
Date: 6/28/22     Start Time: 1045     End Time:  Noon     Number of Participants: 6     Type of Group: Cognitive skills  Topic of Group: Goal Setting  ACT Skills worked on:  [employer] (cognitive defusion)     Patient's Goal: Pt will participate in activity and discussion and reflect on principles discussed. Pt will be able to identify a problem, possible solutions and set goals using smart principles for this week. Notes: Pt was active in group activity and discussion. Pt able to identify problem(need to get medical records sent to ), possible solutions, and goals for this week. .Pt able to engage with group peers, shared own experiences, and was open to learning new information. Status After Intervention:  Unchanged     Participation Level: Active Listener and Interactive     Participation Quality: Appropriate, Attentive, Sharing, and Supportive     Speech:  normal     Thought Process/Content: Logical     Affective Functioning: blunted  Mood: depressed and anxious     Level of consciousness:  Alert, Oriented x4, and Attentive     Response to Learning: Able to verbalize current knowledge/experience, Able to verbalize/acknowledge new learning, Able to retain information, Able to change behavior, and progressing to goal.     Endings: None Reported     Modes of Intervention: Support, Socialization, and Exploration     Discipline Responsible: /Counselor        [x] Check in completed and reviewed. Intervention required.  no

## 2022-06-28 NOTE — PLAN OF CARE
Date: 6/28/22     Start Time: 840     End Time:  1030     Number of Participants: 6     Type of Group: Psychotherapy     Patient's Goal: To increase socialization and improve interpersonal relationships. Notes: Pt was active in group focusing on expression of feelings related to stressors and how they impact mental health symptoms and interpersonal relationships. Themes of today's session surrounded the importance of communication and expressing gratitude within interpersonal relationships and the impact of lack of these on our own mental health. Pt states that she was able to resolve issues with woman she is living with. She relates easily to others, discusses personal issues, and provides helpful support and encouragement to others. Pt reports some improvement in mood following group. Status After Intervention:  Unchanged     Participation Level: Active Listener and Interactive     Participation Quality: Appropriate, Attentive, Sharing, and Supportive     Speech:  normal     Thought Process/Content: Logical     Affective Functioning: Blunted     Mood: depressed and anxious     Level of consciousness:  Alert, Oriented x4, and Attentive     Response to Learning: Able to verbalize current knowledge/experience, Able to verbalize/acknowledge new learning, Able to retain information, Able to change behaviors, and progressing to goal.     Endings: None Reported     Modes of Intervention: Support, Socialization, and Exploration     Discipline Responsible: /Counselor        [x] Check in completed and reviewed. Intervention required.  no

## 2022-07-05 ENCOUNTER — HOSPITAL ENCOUNTER (OUTPATIENT)
Dept: PSYCHIATRY | Age: 42
Setting detail: THERAPIES SERIES
Discharge: HOME | End: 2022-07-05
Payer: COMMERCIAL

## 2022-07-05 PROCEDURE — H2020 THER BEHAV SVC, PER DIEM: HCPCS
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
## 2022-07-06 ENCOUNTER — HOSPITAL ENCOUNTER (OUTPATIENT)
Dept: PSYCHIATRY | Age: 42
Setting detail: THERAPIES SERIES
Discharge: HOME | End: 2022-07-06
Payer: COMMERCIAL

## 2022-07-06 PROCEDURE — H2012 BEHAV HLTH DAY TREAT, PER HR: HCPCS
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
## 2022-07-11 ENCOUNTER — HOSPITAL ENCOUNTER (OUTPATIENT)
Dept: PSYCHIATRY | Age: 42
Setting detail: THERAPIES SERIES
Discharge: HOME | End: 2022-07-11
Payer: COMMERCIAL

## 2022-07-11 DIAGNOSIS — F90.9 ATTENTION DEFICIT HYPERACTIVITY DISORDER (ADHD), UNSPECIFIED ADHD TYPE: ICD-10-CM

## 2022-07-11 DIAGNOSIS — F41.9 ANXIETY DISORDER, UNSPECIFIED TYPE: ICD-10-CM

## 2022-07-11 PROCEDURE — H2020 THER BEHAV SVC, PER DIEM: HCPCS

## 2022-07-11 PROCEDURE — 99214 OFFICE O/P EST MOD 30 MIN: CPT | Performed by: PSYCHIATRY & NEUROLOGY

## 2022-07-11 PROCEDURE — H2012 BEHAV HLTH DAY TREAT, PER HR: HCPCS

## 2022-07-11 RX ORDER — DIVALPROEX SODIUM 250 MG/1
250 TABLET, DELAYED RELEASE ORAL 2 TIMES DAILY
Qty: 60 TABLET | Refills: 0 | Status: SHIPPED | OUTPATIENT
Start: 2022-07-11 | End: 2022-08-04 | Stop reason: SDUPTHER

## 2022-07-11 RX ORDER — CLONAZEPAM 0.5 MG/1
0.5 TABLET ORAL 3 TIMES DAILY
Qty: 90 TABLET | Refills: 0 | Status: SHIPPED | OUTPATIENT
Start: 2022-07-11 | End: 2022-08-04 | Stop reason: SDUPTHER

## 2022-07-11 RX ORDER — DEXTROAMPHETAMINE SACCHARATE, AMPHETAMINE ASPARTATE, DEXTROAMPHETAMINE SULFATE AND AMPHETAMINE SULFATE 5; 5; 5; 5 MG/1; MG/1; MG/1; MG/1
20 TABLET ORAL 2 TIMES DAILY
Qty: 60 TABLET | Refills: 0 | Status: SHIPPED | OUTPATIENT
Start: 2022-07-11 | End: 2022-08-04 | Stop reason: SDUPTHER
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
## 2022-07-11 NOTE — PLAN OF CARE
Pt only stayed for about 10 minutes of second group. She did not inform sw that she was leaving and did not return.

## 2022-07-11 NOTE — LETTER
7/11/22      To whom it may concern,      Slick John is a patient under my care in the [facility] at [facility]. She is being treated for Bipolar II Disorder, PTSD and ADHD, and is currently being prescribed several psychotropic medications. Sincerely,              Rosa Nichols M.D.     Psychiatry
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
## 2022-07-18 NOTE — PLAN OF CARE
ABEBA called [facility] back and obtained med management appt with Ike Garrison NP for Mon., Aug 15 at 36 am.  ABEBA also asked re: case management. She would first need to attend Fri. July 29 appt with Miah South for individual counseling who will need to make referral for case management services.

## 2022-07-25 ENCOUNTER — HOSPITAL ENCOUNTER (OUTPATIENT)
Dept: PSYCHIATRY | Age: 42
Setting detail: THERAPIES SERIES
End: 2022-07-25
Payer: COMMERCIAL

## 2022-08-04 RX ORDER — DIVALPROEX SODIUM 250 MG/1
250 TABLET, DELAYED RELEASE ORAL 2 TIMES DAILY
Qty: 14 TABLET | Refills: 0 | Status: SHIPPED | OUTPATIENT
Start: 2022-08-11 | End: 2022-09-02

## 2022-08-04 RX ORDER — DEXTROAMPHETAMINE SACCHARATE, AMPHETAMINE ASPARTATE, DEXTROAMPHETAMINE SULFATE AND AMPHETAMINE SULFATE 5; 5; 5; 5 MG/1; MG/1; MG/1; MG/1
20 TABLET ORAL 2 TIMES DAILY
Qty: 14 TABLET | Refills: 0 | Status: SHIPPED | OUTPATIENT
Start: 2022-08-11 | End: 2022-09-02

## 2022-08-04 RX ORDER — CLONAZEPAM 0.5 MG/1
0.5 TABLET ORAL 3 TIMES DAILY
Qty: 21 TABLET | Refills: 0 | Status: SHIPPED | OUTPATIENT
Start: 2022-08-11 | End: 2022-08-18

## 2022-08-11 ENCOUNTER — TELEPHONE (OUTPATIENT)
Dept: PSYCHIATRY | Age: 42
End: 2022-08-11

## 2022-08-11 NOTE — CARE COORDINATION
SW received a voicemail from patient stating she Samantha Gordon been calling all week and needs to see the dr tomorrow\" as she states she will run out of her medications. SW received 1 message yesterday that was a hang up and the message today from patient that she is responding to. Upon chart review patient's medications were refilled today by Dr. Shanita Mcleod. SW attempted to call patient back to inform her of this but she did not answer her phone and her voice mailbox was full so SW was unable to leave a message. SW to follow up.      Electronically signed by JOSHUA Judge, PRICE on 8/11/2022 at 8:14 AM

## 2022-08-11 NOTE — TELEPHONE ENCOUNTER
Patient states she will be out of medications. Patient's medications were refilled today by Dr. Samreen Francis. SW called patient back to let her know but patient did not answer and SW was unable to leave a message due to patient's voicemail being full.  SW to follow up

## 2022-08-19 RX ORDER — DIVALPROEX SODIUM 250 MG/1
250 TABLET, DELAYED RELEASE ORAL 2 TIMES DAILY
Qty: 28 TABLET | Refills: 0 | Status: SHIPPED | OUTPATIENT
Start: 2022-08-19 | End: 2022-09-02 | Stop reason: SDUPTHER

## 2022-08-19 RX ORDER — CLONAZEPAM 0.5 MG/1
0.5 TABLET ORAL 3 TIMES DAILY
Qty: 42 TABLET | Refills: 0 | Status: SHIPPED | OUTPATIENT
Start: 2022-08-19 | End: 2022-09-02 | Stop reason: SDUPTHER

## 2022-08-19 RX ORDER — DEXTROAMPHETAMINE SACCHARATE, AMPHETAMINE ASPARTATE, DEXTROAMPHETAMINE SULFATE AND AMPHETAMINE SULFATE 5; 5; 5; 5 MG/1; MG/1; MG/1; MG/1
20 TABLET ORAL 2 TIMES DAILY
Qty: 28 TABLET | Refills: 0 | Status: SHIPPED | OUTPATIENT
Start: 2022-08-19 | End: 2022-09-02 | Stop reason: SDUPTHER

## 2022-09-02 DIAGNOSIS — F90.9 ATTENTION DEFICIT HYPERACTIVITY DISORDER (ADHD), UNSPECIFIED ADHD TYPE: ICD-10-CM

## 2022-09-02 DIAGNOSIS — F41.9 ANXIETY: ICD-10-CM

## 2022-09-02 RX ORDER — DEXTROAMPHETAMINE SACCHARATE, AMPHETAMINE ASPARTATE, DEXTROAMPHETAMINE SULFATE AND AMPHETAMINE SULFATE 5; 5; 5; 5 MG/1; MG/1; MG/1; MG/1
20 TABLET ORAL 2 TIMES DAILY
Qty: 56 TABLET | Refills: 0 | Status: SHIPPED | OUTPATIENT
Start: 2022-09-02 | End: 2022-09-13 | Stop reason: SDUPTHER

## 2022-09-02 RX ORDER — DIVALPROEX SODIUM 250 MG/1
250 TABLET, DELAYED RELEASE ORAL 2 TIMES DAILY
Qty: 56 TABLET | Refills: 0 | Status: SHIPPED | OUTPATIENT
Start: 2022-09-02 | End: 2022-09-13 | Stop reason: SDUPTHER

## 2022-09-02 RX ORDER — CLONAZEPAM 0.5 MG/1
0.5 TABLET ORAL 3 TIMES DAILY
Qty: 84 TABLET | Refills: 0 | Status: SHIPPED | OUTPATIENT
Start: 2022-09-02 | End: 2022-09-13 | Stop reason: SDUPTHER

## 2022-09-13 DIAGNOSIS — F41.9 ANXIETY: ICD-10-CM

## 2022-09-13 DIAGNOSIS — F90.9 ATTENTION DEFICIT HYPERACTIVITY DISORDER (ADHD), UNSPECIFIED ADHD TYPE: ICD-10-CM

## 2022-09-13 RX ORDER — CLONAZEPAM 0.5 MG/1
0.5 TABLET ORAL 3 TIMES DAILY
Qty: 42 TABLET | Refills: 0 | Status: SHIPPED | OUTPATIENT
Start: 2022-09-13 | End: 2022-09-22 | Stop reason: SDUPTHER

## 2022-09-13 RX ORDER — DEXTROAMPHETAMINE SACCHARATE, AMPHETAMINE ASPARTATE, DEXTROAMPHETAMINE SULFATE AND AMPHETAMINE SULFATE 5; 5; 5; 5 MG/1; MG/1; MG/1; MG/1
20 TABLET ORAL 2 TIMES DAILY
Qty: 28 TABLET | Refills: 0 | Status: SHIPPED | OUTPATIENT
Start: 2022-09-13 | End: 2022-09-22 | Stop reason: SDUPTHER

## 2022-09-13 RX ORDER — DIVALPROEX SODIUM 250 MG/1
250 TABLET, DELAYED RELEASE ORAL 2 TIMES DAILY
Qty: 28 TABLET | Refills: 0 | Status: SHIPPED | OUTPATIENT
Start: 2022-09-13 | End: 2022-09-22 | Stop reason: SDUPTHER

## 2022-09-22 DIAGNOSIS — F41.9 ANXIETY: ICD-10-CM

## 2022-09-22 DIAGNOSIS — F90.9 ATTENTION DEFICIT HYPERACTIVITY DISORDER (ADHD), UNSPECIFIED ADHD TYPE: ICD-10-CM

## 2022-09-22 RX ORDER — DIVALPROEX SODIUM 250 MG/1
250 TABLET, DELAYED RELEASE ORAL 2 TIMES DAILY
Qty: 14 TABLET | Refills: 0 | Status: SHIPPED | OUTPATIENT
Start: 2022-09-27 | End: 2022-10-04

## 2022-09-22 RX ORDER — DEXTROAMPHETAMINE SACCHARATE, AMPHETAMINE ASPARTATE, DEXTROAMPHETAMINE SULFATE AND AMPHETAMINE SULFATE 5; 5; 5; 5 MG/1; MG/1; MG/1; MG/1
20 TABLET ORAL 2 TIMES DAILY
Qty: 14 TABLET | Refills: 0 | Status: SHIPPED | OUTPATIENT
Start: 2022-09-27 | End: 2022-10-04

## 2022-09-22 RX ORDER — CLONAZEPAM 0.5 MG/1
0.5 TABLET ORAL 3 TIMES DAILY
Qty: 21 TABLET | Refills: 0 | Status: SHIPPED | OUTPATIENT
Start: 2022-09-27 | End: 2022-10-04

## 2022-10-06 ENCOUNTER — HOSPITAL ENCOUNTER (EMERGENCY)
Age: 42
Discharge: HOME | End: 2022-10-06
Payer: COMMERCIAL

## 2022-10-06 VITALS
OXYGEN SATURATION: 100 % | HEIGHT: 67 IN | WEIGHT: 129 LBS | BODY MASS INDEX: 20.25 KG/M2 | DIASTOLIC BLOOD PRESSURE: 65 MMHG | RESPIRATION RATE: 21 BRPM | SYSTOLIC BLOOD PRESSURE: 122 MMHG | HEART RATE: 96 BPM | TEMPERATURE: 98.1 F

## 2022-10-06 DIAGNOSIS — Z76.0 ENCOUNTER FOR MEDICATION REFILL: Primary | ICD-10-CM

## 2022-10-06 DIAGNOSIS — F90.9 ATTENTION DEFICIT HYPERACTIVITY DISORDER (ADHD), UNSPECIFIED ADHD TYPE: ICD-10-CM

## 2022-10-06 DIAGNOSIS — F41.9 ANXIETY DISORDER, UNSPECIFIED TYPE: ICD-10-CM

## 2022-10-06 PROCEDURE — 99283 EMERGENCY DEPT VISIT LOW MDM: CPT

## 2022-10-06 RX ORDER — DIVALPROEX SODIUM 250 MG/1
250 TABLET, DELAYED RELEASE ORAL 2 TIMES DAILY
Qty: 10 TABLET | Refills: 0 | Status: SHIPPED | OUTPATIENT
Start: 2022-10-06 | End: 2022-10-11

## 2022-10-06 RX ORDER — CLONAZEPAM 0.5 MG/1
0.5 TABLET ORAL 3 TIMES DAILY
Qty: 15 TABLET | Refills: 0 | Status: SHIPPED | OUTPATIENT
Start: 2022-10-06 | End: 2022-10-11

## 2022-10-06 RX ORDER — DEXTROAMPHETAMINE SACCHARATE, AMPHETAMINE ASPARTATE, DEXTROAMPHETAMINE SULFATE AND AMPHETAMINE SULFATE 5; 5; 5; 5 MG/1; MG/1; MG/1; MG/1
20 TABLET ORAL 2 TIMES DAILY
Qty: 10 TABLET | Refills: 0 | Status: SHIPPED | OUTPATIENT
Start: 2022-10-06 | End: 2022-10-11

## 2022-10-06 ASSESSMENT — LIFESTYLE VARIABLES
HOW OFTEN DO YOU HAVE A DRINK CONTAINING ALCOHOL: NEVER
HOW MANY STANDARD DRINKS CONTAINING ALCOHOL DO YOU HAVE ON A TYPICAL DAY: PATIENT DOES NOT DRINK

## 2022-10-06 NOTE — ED PROVIDER NOTES
Independent MLP  HPI:  10/6/22,   Time: 12:44 PM EDT         Iesha Hernandez is a 35 y.o. female presenting to the ED for refills of her antipsychotic medications. Patient reports she has been out of her medications for the last 3 days. Patient is currently established with [facility] counseling services and is in the process of switching her car from her current counselor. He has reached out to them for refills but they are unable to provide until she is seen. Patient denies chest pain, shortness of breath, palpitations, racing thoughts, or any SI or HI. Patient reports she has been stable with these medications and does not want to have any slip ups. She does have an appointment scheduled with her counselor this Monday. ROS:   Pertinent positives and negatives are stated within HPI, all other systems reviewed and are negative.  --------------------------------------------- PAST HISTORY ---------------------------------------------  Past Medical History:  has a past medical history of Depression, Hepatitis C, History of MRSA infection, Substance abuse ([facility] [address] and Varicosities. Past Surgical History:  has a past surgical history that includes  section and Dilation & curettage. Social History:  reports that she has been smoking. She has a 8.50 pack-year smoking history. She has never used smokeless tobacco. She reports that she does not drink alcohol and does not use drugs. Family History: family history is not on file. The patients home medications have been reviewed. Allergies: Patient has no known allergies. -------------------------------------------------- RESULTS -------------------------------------------------  All laboratory and radiology results have been personally reviewed by myself   LABS:  No results found for this visit on 10/06/22.     RADIOLOGY:  Interpreted by Radiologist.  No orders to display       ------------------------- NURSING NOTES AND VITALS REVIEWED ---------------------------   The nursing notes within the ED encounter and vital signs as below have been reviewed. /65   Pulse 96   Temp 98.1 °F (36.7 °C)   Resp 21   Ht 5' 7\" (1.702 m)   Wt 129 lb (58.5 kg)   SpO2 100%   BMI 20.20 kg/m²   Oxygen Saturation Interpretation: Normal      ---------------------------------------------------PHYSICAL EXAM--------------------------------------      Constitutional/General: Alert and oriented x3, well appearing, non toxic in NAD  Head: NC/AT  Eyes: PERRL, EOMI  Mouth: Oropharynx clear, handling secretions, no trismus  Neck: Supple, full ROM, no meningeal signs  Pulmonary: Lungs clear to auscultation bilaterally, no wheezes, rales, or rhonchi. Not in respiratory distress  Cardiovascular:  Regular rate and rhythm, no murmurs, gallops, or rubs. 2+ distal pulses  Abdomen: Soft, non tender, non distended,   Extremities: Moves all extremities x 4. Warm and well perfused  Skin: warm and dry without rash  Neurologic: GCS 15,  Psych: Normal Affect, rapid speech      ------------------------------ ED COURSE/MEDICAL DECISION MAKING----------------------  Medications - No data to display      Medical Decision Making:    Patient is a 52-year-old female presenting with complaints of need for medication refill. Patient does have appropriate follow-up in place and is scheduled for an appointment with her counselor on Monday. Unfortunately due to issues with changing counselors she was not able to have her medications refilled in a timely manner. I did discuss with the patient the need for appropriate follow-up and appropriate to obtain medication refills. To help prevent relapse of symptoms, medication refills will be provided for 5 days to allow her time to make it to her counseling appointment on Monday. Counseling:    The emergency provider has spoken with the patient and discussed todays results, in addition to providing specific details for the plan of care and counseling regarding the diagnosis and prognosis. Questions are answered at this time and they are agreeable with the plan.      --------------------------------- IMPRESSION AND DISPOSITION ---------------------------------    IMPRESSION  1. Encounter for medication refill    2. Attention deficit hyperactivity disorder (ADHD), unspecified ADHD type    3.  Anxiety disorder, unspecified type        DISPOSITION  Disposition: Discharge to home  Patient condition is good                 ELIAS Villa CNP  10/06/22 [address], ELIAS - CNP  10/06/22 0121

## 2023-11-09 ENCOUNTER — HOSPITAL ENCOUNTER (OUTPATIENT)
Age: 43
Discharge: HOME | End: 2023-11-09
Payer: MEDICARE

## 2023-11-09 LAB
6MAM UR QL SCN: NEGATIVE
ABNORMAL SPECIMEN VALIDITY TEST: ABNORMAL
AMPHET UR QL SCN: POSITIVE
BARBITURATES UR QL SCN: NEGATIVE
BENZODIAZ UR QL: NEGATIVE
BUPRENORPHINE UR QL: POSITIVE
CANNABINOIDS UR QL SCN: POSITIVE
COCAINE UR QL SCN: NEGATIVE
ETHANOL UR-MCNC: NOT DETECTED MG/DL
FENTANYL UR QL: NEGATIVE
INTEGRITY CHECK, CREATININE, URINE: 236.6 MG/DL (ref 22–250)
INTEGRITY CHECK, OXIDANT, URINE: 65 MG/L
INTEGRITY CHECK, PH, URINE: 5.5 (ref 4.5–9)
INTEGRITY CHECK, SPECIFIC GRAVITY, URINE: 1.03 (ref 1–1.03)
METHADONE UR QL: NEGATIVE
OPIATES UR QL SCN: NEGATIVE
OXYCODONE UR QL SCN: NEGATIVE
PCP UR QL SCN: NEGATIVE
TEST INFORMATION: ABNORMAL
TRAMADOL, URINE: NEGATIVE

## 2023-11-09 PROCEDURE — G0480 DRUG TEST DEF 1-7 CLASSES: HCPCS

## 2023-11-09 PROCEDURE — 80307 DRUG TEST PRSMV CHEM ANLYZR: CPT

## 2023-11-10 LAB
AMPHET UR-MCNC: >1000 NG/ML
BUPRENORPHINE UR-MCNC: 105.9 NG/ML
COMPLIANCE DRUG ANALYSIS, URINE: NORMAL
EPHEDRIN UR QL: <100 NG/ML
MDA, QUANTITATIVE, URINE: <100 NG/ML
MDEA, QUANTITATIVE, URINE: <100 NG/ML
MDMA UR QL: <100 NG/ML
METHAMPHETAMINE QUANTITATIVE URINE: >1000 NG/ML
NALOXONE, QUANTITATIVE, URINE: 359.9 NG/ML
NORBUPRENORPHINE, QUANTITATIVE, URINE: 551.7 NG/ML
PHENTERMINE, URINE, QUANTITATIVE: <100 NG/ML
SPECIMEN DESCRIPTION: ABNORMAL
THC NORMALIZED, QUANTITIATIVE, URINE: 34.2 NG/ML
THC-COOH, QUANTITATIVE, URINE: 80.9 NG/ML

## 2024-01-10 ENCOUNTER — TELEPHONE (OUTPATIENT)
Dept: INTERNAL MEDICINE | Age: 44
End: 2024-01-10

## 2024-01-10 ENCOUNTER — OFFICE VISIT (OUTPATIENT)
Dept: PRIMARY CARE CLINIC | Age: 44
End: 2024-01-10

## 2024-01-10 VITALS
WEIGHT: 126 LBS | SYSTOLIC BLOOD PRESSURE: 122 MMHG | BODY MASS INDEX: 19.78 KG/M2 | TEMPERATURE: 97.6 F | RESPIRATION RATE: 16 BRPM | OXYGEN SATURATION: 97 % | DIASTOLIC BLOOD PRESSURE: 80 MMHG | HEART RATE: 74 BPM | HEIGHT: 67 IN

## 2024-01-10 DIAGNOSIS — F90.9 ATTENTION DEFICIT HYPERACTIVITY DISORDER (ADHD), UNSPECIFIED ADHD TYPE: ICD-10-CM

## 2024-01-10 DIAGNOSIS — R22.2 ABDOMINAL WALL MASS: Primary | ICD-10-CM

## 2024-01-10 DIAGNOSIS — M19.012 PRIMARY OSTEOARTHRITIS OF LEFT SHOULDER: ICD-10-CM

## 2024-01-10 RX ORDER — DIVALPROEX SODIUM 250 MG/1
250 TABLET, DELAYED RELEASE ORAL 2 TIMES DAILY
Qty: 10 TABLET | Refills: 0 | Status: SHIPPED
Start: 2024-01-10 | End: 2024-01-10 | Stop reason: SDUPTHER

## 2024-01-10 RX ORDER — BUPRENORPHINE AND NALOXONE 8; 2 MG/1; MG/1
FILM, SOLUBLE BUCCAL; SUBLINGUAL
COMMUNITY
Start: 2024-01-01

## 2024-01-10 RX ORDER — NAPROXEN 500 MG/1
500 TABLET ORAL 2 TIMES DAILY WITH MEALS
Qty: 60 TABLET | Refills: 3 | Status: SHIPPED | OUTPATIENT
Start: 2024-01-10

## 2024-01-10 RX ORDER — DIVALPROEX SODIUM 250 MG/1
250 TABLET, DELAYED RELEASE ORAL 2 TIMES DAILY
Qty: 60 TABLET | Refills: 0 | Status: SHIPPED | OUTPATIENT
Start: 2024-01-10 | End: 2024-02-09

## 2024-01-10 SDOH — ECONOMIC STABILITY: FOOD INSECURITY: WITHIN THE PAST 12 MONTHS, YOU WORRIED THAT YOUR FOOD WOULD RUN OUT BEFORE YOU GOT MONEY TO BUY MORE.: NEVER TRUE

## 2024-01-10 SDOH — ECONOMIC STABILITY: FOOD INSECURITY: WITHIN THE PAST 12 MONTHS, THE FOOD YOU BOUGHT JUST DIDN'T LAST AND YOU DIDN'T HAVE MONEY TO GET MORE.: NEVER TRUE

## 2024-01-10 SDOH — ECONOMIC STABILITY: HOUSING INSECURITY
IN THE LAST 12 MONTHS, WAS THERE A TIME WHEN YOU DID NOT HAVE A STEADY PLACE TO SLEEP OR SLEPT IN A SHELTER (INCLUDING NOW)?: NO

## 2024-01-10 SDOH — ECONOMIC STABILITY: INCOME INSECURITY: HOW HARD IS IT FOR YOU TO PAY FOR THE VERY BASICS LIKE FOOD, HOUSING, MEDICAL CARE, AND HEATING?: NOT HARD AT ALL

## 2024-01-10 ASSESSMENT — ENCOUNTER SYMPTOMS
SHORTNESS OF BREATH: 0
VOMITING: 0
ABDOMINAL PAIN: 0
NAUSEA: 0
DIARRHEA: 0
COUGH: 0

## 2024-01-10 ASSESSMENT — PATIENT HEALTH QUESTIONNAIRE - PHQ9
SUM OF ALL RESPONSES TO PHQ QUESTIONS 1-9: 0
SUM OF ALL RESPONSES TO PHQ9 QUESTIONS 1 & 2: 0
2. FEELING DOWN, DEPRESSED OR HOPELESS: 0
1. LITTLE INTEREST OR PLEASURE IN DOING THINGS: 0

## 2024-01-10 NOTE — PROGRESS NOTES
effort is normal.      Breath sounds: Normal breath sounds.   Abdominal:      Palpations: There is no hepatomegaly or splenomegaly.      Tenderness: There is no abdominal tenderness.      Comments: Hard tender mass lower abdominal wall    Musculoskeletal:      Comments: Left shoulder crepitus pain with abduction    Skin:     Coloration: Skin is not cyanotic.      Findings: No bruising or rash.      Nails: There is no clubbing.   Neurological:      Deep Tendon Reflexes: Reflexes normal.       Extremities: Pedal pulses No Edema     ASSESSMENT/PLAN  Yajaira CASSIDY was seen today for new patient.    Diagnoses and all orders for this visit:    Abdominal wall mass  -     CT ABDOMEN PELVIS W WO CONTRAST Additional Contrast? 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 500 mg bid     Other orders  -     Discontinue: divalproex (DEPAKOTE) 250 MG DR tablet; Take 1 tablet by mouth 2 times daily for 5 days  -     naproxen (NAPROSYN) 500 MG tablet; Take 1 tablet by mouth 2 times daily (with meals)  -     divalproex (DEPAKOTE) 250 MG DR tablet; Take 1 tablet by mouth 2 times daily        Outpatient Encounter Medications as of 1/10/2024   Medication Sig Dispense Refill    buprenorphine-naloxone (SUBOXONE) 8-2 MG FILM SL film dissolve 1 FILM under the tongue three times a day      naproxen (NAPROSYN) 500 MG tablet Take 1 tablet by mouth 2 times daily (with meals) 60 tablet 3    divalproex (DEPAKOTE) 250 MG DR tablet Take 1 tablet by mouth 2 times daily 60 tablet 0    [DISCONTINUED] divalproex (DEPAKOTE) 250 MG DR tablet Take 1 tablet by mouth 2 times daily for 5 days 10 tablet 0    [DISCONTINUED] amphetamine-dextroamphetamine (ADDERALL) 20 MG tablet Take 1 tablet by mouth 2 times daily for 5 days. 10 tablet 0    [DISCONTINUED]

## 2024-01-10 NOTE — TELEPHONE ENCOUNTER
JESSICA left message for pt related to referral. SW provided contact information for return call.

## 2024-01-17 ENCOUNTER — HOSPITAL ENCOUNTER (OUTPATIENT)
Dept: PSYCHIATRY | Age: 44
Setting detail: THERAPIES SERIES
Discharge: HOME | End: 2024-01-17
Payer: MEDICARE

## 2024-01-17 PROCEDURE — 90791 PSYCH DIAGNOSTIC EVALUATION: CPT

## 2024-01-17 ASSESSMENT — PATIENT HEALTH QUESTIONNAIRE - PHQ9
SUM OF ALL RESPONSES TO PHQ QUESTIONS 1-9: 10
8. MOVING OR SPEAKING SO SLOWLY THAT OTHER PEOPLE COULD HAVE NOTICED. OR THE OPPOSITE, BEING SO FIGETY OR RESTLESS THAT YOU HAVE BEEN MOVING AROUND A LOT MORE THAN USUAL: 3
SUM OF ALL RESPONSES TO PHQ QUESTIONS 1-9: 10
5. POOR APPETITE OR OVEREATING: 0
9. THOUGHTS THAT YOU WOULD BE BETTER OFF DEAD, OR OF HURTING YOURSELF: 0
SUM OF ALL RESPONSES TO PHQ QUESTIONS 1-9: 10
SUM OF ALL RESPONSES TO PHQ QUESTIONS 1-9: 10
2. FEELING DOWN, DEPRESSED OR HOPELESS: 3
4. FEELING TIRED OR HAVING LITTLE ENERGY: 0
SUM OF ALL RESPONSES TO PHQ9 QUESTIONS 1 & 2: 3
7. TROUBLE CONCENTRATING ON THINGS, SUCH AS READING THE NEWSPAPER OR WATCHING TELEVISION: 3
3. TROUBLE FALLING OR STAYING ASLEEP: 0
1. LITTLE INTEREST OR PLEASURE IN DOING THINGS: 0
10. IF YOU CHECKED OFF ANY PROBLEMS, HOW DIFFICULT HAVE THESE PROBLEMS MADE IT FOR YOU TO DO YOUR WORK, TAKE CARE OF THINGS AT HOME, OR GET ALONG WITH OTHER PEOPLE: 1
6. FEELING BAD ABOUT YOURSELF - OR THAT YOU ARE A FAILURE OR HAVE LET YOURSELF OR YOUR FAMILY DOWN: 1

## 2024-01-17 ASSESSMENT — ANXIETY QUESTIONNAIRES
7. FEELING AFRAID AS IF SOMETHING AWFUL MIGHT HAPPEN: 3
IF YOU CHECKED OFF ANY PROBLEMS ON THIS QUESTIONNAIRE, HOW DIFFICULT HAVE THESE PROBLEMS MADE IT FOR YOU TO DO YOUR WORK, TAKE CARE OF THINGS AT HOME, OR GET ALONG WITH OTHER PEOPLE: EXTREMELY DIFFICULT
GAD7 TOTAL SCORE: 21
6. BECOMING EASILY ANNOYED OR IRRITABLE: 3
1. FEELING NERVOUS, ANXIOUS, OR ON EDGE: 3
5. BEING SO RESTLESS THAT IT IS HARD TO SIT STILL: 3
4. TROUBLE RELAXING: 3
2. NOT BEING ABLE TO STOP OR CONTROL WORRYING: 3
3. WORRYING TOO MUCH ABOUT DIFFERENT THINGS: 3

## 2024-01-17 ASSESSMENT — SLEEP AND FATIGUE QUESTIONNAIRES
DO YOU HAVE DIFFICULTY SLEEPING: NO
DO YOU USE A SLEEP AID: COMMENT
AVERAGE NUMBER OF SLEEP HOURS: 5
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
## 2024-01-19 ENCOUNTER — HOSPITAL ENCOUNTER (OUTPATIENT)
Age: 44
Discharge: HOME | End: 2024-01-19
Payer: MEDICARE

## 2024-01-19 LAB
AMPHET UR QL SCN: POSITIVE
BARBITURATES UR QL SCN: NEGATIVE
BENZODIAZ UR QL: NEGATIVE
BUPRENORPHINE UR QL: POSITIVE
CANNABINOIDS UR QL SCN: NEGATIVE
COCAINE UR QL SCN: NEGATIVE
FENTANYL UR QL: POSITIVE
METHADONE UR QL: NEGATIVE
OPIATES UR QL SCN: NEGATIVE
OXYCODONE UR QL SCN: NEGATIVE
PCP UR QL SCN: NEGATIVE
TEST INFORMATION: ABNORMAL

## 2024-01-19 PROCEDURE — 80307 DRUG TEST PRSMV CHEM ANLYZR: CPT

## 2024-01-22 ENCOUNTER — HOSPITAL ENCOUNTER (OUTPATIENT)
Dept: PSYCHIATRY | Age: 44
Setting detail: THERAPIES SERIES
Discharge: HOME | End: 2024-01-22
Payer: MEDICARE

## 2024-01-22 LAB
ABNORMAL SPECIMEN VALIDITY TEST: NORMAL
AMPHET UR-MCNC: >1000 NG/ML
BUPRENORPHINE UR-MCNC: 437.2 NG/ML
COMPLIANCE DRUG ANALYSIS, URINE: NORMAL
EPHEDRIN UR QL: <100 NG/ML
FENTANYL, URN, QUANT: <5 NG/ML
INTEGRITY CHECK, CREATININE, URINE: 135.4 MG/DL (ref 22–250)
INTEGRITY CHECK, OXIDANT, URINE: <40 MG/L
INTEGRITY CHECK, PH, URINE: 7.4 (ref 4.5–9)
INTEGRITY CHECK, SPECIFIC GRAVITY, URINE: 1.02 (ref 1–1.03)
MDA, QUANTITATIVE, URINE: <100 NG/ML
MDEA, QUANTITATIVE, URINE: <100 NG/ML
MDMA UR QL: <100 NG/ML
METHAMPHETAMINE QUANTITATIVE URINE: >1000 NG/ML
NALOXONE, QUANTITATIVE, URINE: >1000 NG/ML
NORBUPRENORPHINE, QUANTITATIVE, URINE: 545.4 NG/ML
NORFENTANYL, URN, QUANT: <10 NG/ML
PHENTERMINE, URINE, QUANTITATIVE: <100 NG/ML
SPECIMEN DESCRIPTION: ABNORMAL

## 2024-01-22 PROCEDURE — 90853 GROUP PSYCHOTHERAPY: CPT

## 2024-01-22 NOTE — GROUP NOTE
Group Therapy Note     01/22/2024    Start time:  8:45 am  End time: 10:30 am  Number of participants:  6  Type of group: Psychotherapy  Mode of intervention: Education, Support, Socialization, Exploration, Clarifying, Problem-solving, and Activity     Type of Group: Psychotherapy     Patient's Goal:  To get better     Notes: Patient was active in group focusing on expression of feelings related to stressors and how they impact mental health symptoms. Themes of group centered on current symptom presentation, interpersonal relationships, communication, and problem solving. Pt was able to check in and share that this is her first IOP group session.  She shared that over the weekend she helped a friend clean out a house.  Yajaira (William) talked about not having custody of her 10 yo daughter but would like to be able to get visitation rights.  She states she talks with her on the phone but that's about all she does.  Her daughter is a daddy's girl.  Yajaira also talked about how she is always getting into a bad relationship.      Yajaira was extremely fidgety throughout the group session. She was continuing dropping multiple items she had in her hands and reaching down to pick them up. Her phone kept ringing and she would attempt to shut it off but was not able to figure out how to do so.  Yajaira was distracting to other group members.      Daily check in sheet was negative for HI and SI.    Status After Intervention:  Unchanged    Participation Level: Active Listener and Interactive    Participation Quality: Inappropriate      Speech:  normal      Thought Process/Content: Logical      Affective Functioning: Congruent      Mood: anxious and elevated      Level of consciousness:  Preoccupied      Response to Learning: Able to retain information and Capable of insight      Endings: Self-harm    Modes of Intervention: Education, Support, Socialization, Exploration, and Clarifying      Discipline Responsible: Social

## 2024-01-22 NOTE — GROUP NOTE
Group Therapy Note    Date: 1/22/2024    Group Start Time: 10:45 AM  Group End Time: 12:00 PM  Group Topic: Psychoeducation    [facility] OP Lisa Lainez LISW-S        Group Therapy Note    Attendees: 6     Group Topic: ACT Psychological Flexibility skills of Values Clarification and Values Enactment. \"Take your mind for a Walk \" ACT Exercise.    Patient's Goal:  Pt will participate in discussion and Activity that depicted what it means to defuse from ones thoughts. Pt will be able to reflect on information presented and ACT principles of detachment from thoughts, present focused perspective, and values enactment.    Notes:  Pt actively participated in group discussion and activity. She seemed to have some difficulty maintaining focus as she seemed to be very disorganized and had misplaced her car keys. She will need further education and practice on the ACT principles. Her disorganization made it difficult for her to reflect on principles or to engage with others. She was able to share own experiences, and was open to learning new information.    Status After Intervention:  Unchanged    Participation Level: Interactive    Participation Quality: Inattentive at times, Sharing and Supportive      Speech:  normal      Thought Process/Content: Linear  Flight of ideas      Affective Functioning: Congruent      Mood: anxious      Level of consciousness:  Alert, Oriented x4, Preoccupied, and Inattentive      Response to Learning: Able to verbalize current knowledge/experience, Able to verbalize/acknowledge new learning, and Progressing to goal      Endings: None Reported    Modes of Intervention: Education, Support, Socialization, Exploration, Activity, and Movement      Discipline Responsible: /Counselor      Signature:  PANTERA Ramon

## 2024-01-23 NOTE — H&P
Attempted to meet with patient for initial evaluation. Patient seems to be under the influence of stimulants and presents distractible, restless and hyper-verbal. She is medication-seeking and demanding I restart Adderall even though she has not filled for this in over a year. In reviewing [place] it was discovered that patient is currently prescribed a maximum dose of daily Suboxone. Discussed my concerns for restarting prescription stimulant given her history of addiction and what she is currently prescribed. I attempted to discuss alterative non-stimulant treatments for ADHD but patient was dismissive and becoming agitated. Ultimately she abruptly left and did not finish assessment. Discussed with staff and patient will be discharged from [facility].     Electronically signed by Cedric Obrien MD on 1/23/2024 at 11:08 AM

## 2024-01-23 NOTE — CARE COORDINATION
Treatment team meeting held on 1/23/24 at which time pts case and needs were discussed. Team members present included: Dr Camille MD, PANTERA Hernandez, PANTERA Koehler, Zoë CORTEZ, Director of Nursing, JESSICA Jack, and PRICE Ibarra.    Pt attended first day of  iop program on 1/22/24 following her intake on 1/17/24. On 1/22, Patient seemed to be under the influence of stimulants and presented as distractible, restless and hyper-verbal. She appeared to be medication-seeking.  Dr Obrien had discussed concerns for restarting prescription stimulant given her history of addiction and what she was currently prescribed at that time. Dr attempted to discuss alterative non-stimulant treatments for ADHD but patient was dismissive and became agitated, left abruptly, and did not finish assessment. Staff discussed this on 1/23 during treatment team. Decision made to d/c pt from IOP on 1/23/24.      D/c from IOP on 1/23/24.

## 2024-03-19 ENCOUNTER — HOSPITAL ENCOUNTER (OUTPATIENT)
Age: 44
Discharge: HOME | End: 2024-03-19
Payer: MEDICARE

## 2024-03-19 PROCEDURE — 80307 DRUG TEST PRSMV CHEM ANLYZR: CPT
